# Patient Record
Sex: FEMALE | Race: WHITE | NOT HISPANIC OR LATINO | Employment: FULL TIME | URBAN - METROPOLITAN AREA
[De-identification: names, ages, dates, MRNs, and addresses within clinical notes are randomized per-mention and may not be internally consistent; named-entity substitution may affect disease eponyms.]

---

## 2019-04-08 ENCOUNTER — OFFICE VISIT (OUTPATIENT)
Dept: DERMATOLOGY | Facility: CLINIC | Age: 54
End: 2019-04-08
Payer: COMMERCIAL

## 2019-04-08 DIAGNOSIS — D22.30 MELANOCYTIC NEVI OF FACE: ICD-10-CM

## 2019-04-08 DIAGNOSIS — L40.0 PSORIASIS VULGARIS: ICD-10-CM

## 2019-04-08 DIAGNOSIS — R23.8 VENOUS LAKE: Primary | ICD-10-CM

## 2019-04-08 DIAGNOSIS — L90.5 SCAR: ICD-10-CM

## 2019-04-08 DIAGNOSIS — D22.5 MELANOCYTIC NEVI OF TRUNK: ICD-10-CM

## 2019-04-08 DIAGNOSIS — Z85.828 HISTORY OF NONMELANOMA SKIN CANCER: ICD-10-CM

## 2019-04-08 PROCEDURE — 99203 OFFICE O/P NEW LOW 30 MIN: CPT | Mod: S$GLB,,, | Performed by: DERMATOLOGY

## 2019-04-08 PROCEDURE — 99203 PR OFFICE/OUTPT VISIT, NEW, LEVL III, 30-44 MIN: ICD-10-PCS | Mod: S$GLB,,, | Performed by: DERMATOLOGY

## 2019-04-08 NOTE — PROGRESS NOTES
"  Subjective:       Patient ID:  Jeana Izquierdo is a 54 y.o. female who presents for   Chief Complaint   Patient presents with    Skin Check     TBSE    Spot     dark, right eye lid     This is a high risk patient with a history of skin cancer who is here today to check for the development of new lesions. She c/o of a spot to her right eyelid that has been present for about 1 year. Pt states that spot is dark and comes and goes.     Patient is here today for a "mole" check.   Pt has a history of  average sun exposure in the past.   Pt recalls several blistering sunburns in the past- no  Pt has history of tanning bed use- yes  Pt has  had moles removed in the past- no  Pt has history of melanoma in first degree relatives-  no        Spot  - Initial  Affected locations: right eye  Duration: 1 year  Signs and Symptoms: dark.  Severity: mild  Timing: intermittent  Aggravated by: nothing  Relieving factors/Treatments tried: nothing  Improvement on treatment: no relief      Review of Systems   Constitutional: Negative for fever.   HENT: Negative for sore throat.    Gastrointestinal: Negative for diarrhea.   Musculoskeletal: Negative for joint swelling and arthralgias.   Skin: Positive for itching, rash, daily sunscreen use, activity-related sunscreen use and wears hat.   Hematologic/Lymphatic: Does not bruise/bleed easily.        Objective:    Physical Exam   Constitutional: She appears well-developed and well-nourished. No distress.   HENT:   Mouth/Throat: Lips normal.    Eyes: Lids are normal.  No conjunctival no injection.   Neurological: She is alert and oriented to person, place, and time. She is not disoriented.   Psychiatric: She has a normal mood and affect.   Skin:   Areas Examined (abnormalities noted in diagram):   Scalp / Hair Palpated and Inspected  Head / Face Inspection Performed  Neck Inspection Performed  Chest / Axilla Inspection Performed  Abdomen Inspection Performed  Genitals / Buttocks / " Groin Inspection Performed  Back Inspection Performed  RUE Inspected  LUE Inspection Performed  RLE Inspected  LLE Inspection Performed  Nails and Digits Inspection Performed                   Diagram Legend     Erythematous scaling macule/papule c/w actinic keratosis       Vascular papule c/w angioma      Pigmented verrucoid papule/plaque c/w seborrheic keratosis      Yellow umbilicated papule c/w sebaceous hyperplasia      Irregularly shaped tan macule c/w lentigo     1-2 mm smooth white papules consistent with Milia      Movable subcutaneous cyst with punctum c/w epidermal inclusion cyst      Subcutaneous movable cyst c/w pilar cyst      Firm pink to brown papule c/w dermatofibroma      Pedunculated fleshy papule(s) c/w skin tag(s)      Evenly pigmented macule c/w junctional nevus     Mildly variegated pigmented, slightly irregular-bordered macule c/w mildly atypical nevus      Flesh colored to evenly pigmented papule c/w intradermal nevus       Pink pearly papule/plaque c/w basal cell carcinoma      Erythematous hyperkeratotic cursted plaque c/w SCC      Surgical scar with no sign of skin cancer recurrence      Open and closed comedones      Inflammatory papules and pustules      Verrucoid papule consistent consistent with wart     Erythematous eczematous patches and plaques     Dystrophic onycholytic nail with subungual debris c/w onychomycosis     Umbilicated papule    Erythematous-base heme-crusted tan verrucoid plaque consistent with inflamed seborrheic keratosis     Erythematous Silvery Scaling Plaque c/w Psoriasis     See annotation      Assessment / Plan:        [place]ous [place]  Reassurance was given to the patient. No treatment is necessary.    Melanocytic nevi of face  Melanocytic nevi of trunk  Multiple benign-appearing nevi present on exam today. Reassurance provided. Counseled patient to periodically examine moles and return to clinic if any changes in size, shape, or color are noted or if it becomes  symptomatic (bleeding, itching, pain, etc).    Scar  History of nonmelanoma skin cancer  Area(s) of previous nonmelanoma skin cancer evaluated with no signs of recurrence. Reassurance provided.    Psoriasis vulgaris  Discussed the nature of psoriasis and the available therapeutic options, including topical therapy, phototherapy, systemic medications, and biologics. Patient would like to continue with topicals for now.  Continue betamethasone diprop lotion prn.  Discussed the potential for psoriatic arthritis and the need for early treatment of any joint disease as the joint disease is irreversible.     Follow up in about 1 year (around 4/8/2020) for TBSE, or sooner if any new problems or changing lesions.

## 2019-06-12 ENCOUNTER — PATIENT MESSAGE (OUTPATIENT)
Dept: DERMATOLOGY | Facility: CLINIC | Age: 54
End: 2019-06-12

## 2021-02-25 ENCOUNTER — IMMUNIZATION (OUTPATIENT)
Dept: INTERNAL MEDICINE | Facility: CLINIC | Age: 56
End: 2021-02-25
Payer: COMMERCIAL

## 2021-02-25 DIAGNOSIS — Z23 NEED FOR VACCINATION: Primary | ICD-10-CM

## 2021-02-25 PROCEDURE — 91300 COVID-19, MRNA, LNP-S, PF, 30 MCG/0.3 ML DOSE VACCINE: CPT | Mod: PBBFAC | Performed by: INTERNAL MEDICINE

## 2021-03-18 ENCOUNTER — IMMUNIZATION (OUTPATIENT)
Dept: INTERNAL MEDICINE | Facility: CLINIC | Age: 56
End: 2021-03-18
Payer: COMMERCIAL

## 2021-03-18 DIAGNOSIS — Z23 NEED FOR VACCINATION: Primary | ICD-10-CM

## 2021-03-18 PROCEDURE — 91300 COVID-19, MRNA, LNP-S, PF, 30 MCG/0.3 ML DOSE VACCINE: CPT | Mod: PBBFAC | Performed by: INTERNAL MEDICINE

## 2021-03-18 PROCEDURE — 0002A COVID-19, MRNA, LNP-S, PF, 30 MCG/0.3 ML DOSE VACCINE: CPT | Mod: PBBFAC | Performed by: INTERNAL MEDICINE

## 2021-06-17 ENCOUNTER — PATIENT MESSAGE (OUTPATIENT)
Dept: DERMATOLOGY | Facility: CLINIC | Age: 56
End: 2021-06-17

## 2021-06-18 ENCOUNTER — OFFICE VISIT (OUTPATIENT)
Dept: DERMATOLOGY | Facility: CLINIC | Age: 56
End: 2021-06-18
Payer: COMMERCIAL

## 2021-06-18 DIAGNOSIS — L40.0 PSORIASIS VULGARIS: Primary | ICD-10-CM

## 2021-06-18 PROCEDURE — 99214 OFFICE O/P EST MOD 30 MIN: CPT | Mod: S$GLB,,, | Performed by: DERMATOLOGY

## 2021-06-18 PROCEDURE — 1126F PR PAIN SEVERITY QUANTIFIED, NO PAIN PRESENT: ICD-10-PCS | Mod: S$GLB,,, | Performed by: DERMATOLOGY

## 2021-06-18 PROCEDURE — 1126F AMNT PAIN NOTED NONE PRSNT: CPT | Mod: S$GLB,,, | Performed by: DERMATOLOGY

## 2021-06-18 PROCEDURE — 99214 PR OFFICE/OUTPT VISIT, EST, LEVL IV, 30-39 MIN: ICD-10-PCS | Mod: S$GLB,,, | Performed by: DERMATOLOGY

## 2021-06-18 RX ORDER — BETAMETHASONE VALERATE 0.1 %
LOTION (ML) TOPICAL
Qty: 60 ML | Refills: 3 | Status: SHIPPED | OUTPATIENT
Start: 2021-06-18 | End: 2022-06-06

## 2021-06-18 RX ORDER — BETAMETHASONE DIPROPIONATE 0.5 MG/G
CREAM TOPICAL
Qty: 50 G | Refills: 5 | Status: SHIPPED | OUTPATIENT
Start: 2021-06-18 | End: 2022-06-06

## 2021-06-21 ENCOUNTER — TELEPHONE (OUTPATIENT)
Dept: DERMATOLOGY | Facility: CLINIC | Age: 56
End: 2021-06-21

## 2021-07-06 ENCOUNTER — TELEPHONE (OUTPATIENT)
Dept: DERMATOLOGY | Facility: CLINIC | Age: 56
End: 2021-07-06

## 2022-06-06 ENCOUNTER — OFFICE VISIT (OUTPATIENT)
Dept: DERMATOLOGY | Facility: CLINIC | Age: 57
End: 2022-06-06
Payer: COMMERCIAL

## 2022-06-06 DIAGNOSIS — D22.9 MULTIPLE BENIGN NEVI: ICD-10-CM

## 2022-06-06 DIAGNOSIS — Z12.83 SCREENING EXAM FOR SKIN CANCER: ICD-10-CM

## 2022-06-06 DIAGNOSIS — Z85.828 HISTORY OF NONMELANOMA SKIN CANCER: ICD-10-CM

## 2022-06-06 DIAGNOSIS — L81.4 LENTIGINES: ICD-10-CM

## 2022-06-06 DIAGNOSIS — L40.0 PSORIASIS VULGARIS: Primary | ICD-10-CM

## 2022-06-06 DIAGNOSIS — L91.0 KELOID: ICD-10-CM

## 2022-06-06 DIAGNOSIS — D22.30 MELANOCYTIC NEVI OF FACE: ICD-10-CM

## 2022-06-06 DIAGNOSIS — D49.2 NEOPLASM OF SKIN OF EYELID: ICD-10-CM

## 2022-06-06 PROCEDURE — 1160F PR REVIEW ALL MEDS BY PRESCRIBER/CLIN PHARMACIST DOCUMENTED: ICD-10-PCS | Mod: CPTII,S$GLB,, | Performed by: DERMATOLOGY

## 2022-06-06 PROCEDURE — 99214 PR OFFICE/OUTPT VISIT, EST, LEVL IV, 30-39 MIN: ICD-10-PCS | Mod: S$GLB,,, | Performed by: DERMATOLOGY

## 2022-06-06 PROCEDURE — 1159F MED LIST DOCD IN RCRD: CPT | Mod: CPTII,S$GLB,, | Performed by: DERMATOLOGY

## 2022-06-06 PROCEDURE — 99214 OFFICE O/P EST MOD 30 MIN: CPT | Mod: S$GLB,,, | Performed by: DERMATOLOGY

## 2022-06-06 PROCEDURE — 1159F PR MEDICATION LIST DOCUMENTED IN MEDICAL RECORD: ICD-10-PCS | Mod: CPTII,S$GLB,, | Performed by: DERMATOLOGY

## 2022-06-06 PROCEDURE — 1160F RVW MEDS BY RX/DR IN RCRD: CPT | Mod: CPTII,S$GLB,, | Performed by: DERMATOLOGY

## 2022-06-06 RX ORDER — BETAMETHASONE DIPROPIONATE 0.5 MG/G
CREAM TOPICAL
Qty: 50 G | Refills: 5 | Status: SHIPPED | OUTPATIENT
Start: 2022-06-06 | End: 2023-05-02 | Stop reason: SDUPTHER

## 2022-06-06 RX ORDER — BETAMETHASONE DIPROPIONATE 0.5 MG/G
LOTION TOPICAL
Qty: 60 ML | Refills: 3 | Status: SHIPPED | OUTPATIENT
Start: 2022-06-06 | End: 2023-05-02 | Stop reason: SDUPTHER

## 2022-06-06 NOTE — PROGRESS NOTES
"  Patient Information  Name: Jeana Izquierdo  : 1965  MRN: [id number]     Referring Physician:  No ref. provider found   Primary Care Physician:  Princess Orta MD   Date of Visit: 2022      Subjective:     History of Present lllness:    Jeana Izquierdo is a 57 y.o. female who presents with a chief complaint of moles, lesion, and spot.  Patient is here today for a "mole" check.     Today, patient complains of lesion(s):  Location: right index  Duration: months  Symptoms: was cut, healed funny, slightly tender  Relieving factors/Previous treatments: none    Spot  Location: right eyelid  Duration: years  Signs/Symptoms: blood vessel that popped under the skin and gave her a black eye  Relieving factors/Prior treatments: none    Patient was last seen: 2021.  Prior notes by myself reviewed.   Clinical documentation obtained by nursing staff reviewed.    Review of Systems   Musculoskeletal: Negative for arthralgias.   Skin: Negative for itching and rash.   Hematologic/Lymphatic: Does not bruise/bleed easily.       Objective:   Physical Exam   Constitutional: She appears well-developed and well-nourished. No distress.   HENT:   Mouth/Throat: Lips normal.    Eyes: Lids are normal.  No conjunctival no injection.   Neurological: She is alert and oriented to person, place, and time. She is not disoriented.   Psychiatric: She has a normal mood and affect.   Skin:   Areas Examined (abnormalities noted in diagram):   Scalp / Hair Palpated and Inspected  Head / Face Inspection Performed  Neck Inspection Performed  Chest / Axilla Inspection Performed  Abdomen Inspection Performed  Genitals / Buttocks / Groin Inspection Performed  Back Inspection Performed  RUE Inspected  LUE Inspection Performed  RLE Inspected  LLE Inspection Performed  Nails and Digits Inspection Performed                     Diagram Legend     Erythematous scaling macule/papule c/w actinic keratosis       Vascular papule c/w " angioma      Pigmented verrucoid papule/plaque c/w seborrheic keratosis      Yellow umbilicated papule c/w sebaceous hyperplasia      Irregularly shaped tan macule c/w lentigo     1-2 mm smooth white papules consistent with Milia      Movable subcutaneous cyst with punctum c/w epidermal inclusion cyst      Subcutaneous movable cyst c/w pilar cyst      Firm pink to brown papule c/w dermatofibroma      Pedunculated fleshy papule(s) c/w skin tag(s)      Evenly pigmented macule c/w junctional nevus     Mildly variegated pigmented, slightly irregular-bordered macule c/w mildly atypical nevus      Flesh colored to evenly pigmented papule c/w intradermal nevus       Pink pearly papule/plaque c/w basal cell carcinoma      Erythematous hyperkeratotic cursted plaque c/w SCC      Surgical scar with no sign of skin cancer recurrence      Open and closed comedones      Inflammatory papules and pustules      Verrucoid papule consistent consistent with wart     Erythematous eczematous patches and plaques     Dystrophic onycholytic nail with subungual debris c/w onychomycosis     Umbilicated papule    Erythematous-base heme-crusted tan verrucoid plaque consistent with inflamed seborrheic keratosis     Erythematous Silvery Scaling Plaque c/w Psoriasis     See annotation    No images are attached to the encounter or orders placed in the encounter.      [] Data reviewed  [] Prior external notes reviewed  [] Independent review of test  [] Management discussed with another provider  [] Independent historian    Assessment / Plan:        Psoriasis vulgaris  - chronic problem, not at treatment goal  -     augmented betamethasone dipropionate (DIPROLENE-AF) 0.05 % cream; Apply to affected areas of body daily prn psoriasis.  Dispense: 50 g; Refill: 5  -     betamethasone dipropionate (DIPROLENE) 0.05 % lotion; Apply to scalp daily prn psoriasis  Dispense: 60 mL; Refill: 3  Side effects from the overuse of topical steroids include thinning of  skin, easy tearing/bruising of skin, stretch marks, spider veins, etc. Use the topical steroid no more than 2 days per week if used long-term and/or take breaks from the topical steroid, especially if any of the above side effects are noticed.    Keloid  Recommend OTC silicone gel sheets, such as ScarAway, or OTC silicone tape, such as CicaTape or Mepitac. Sheets can be cut to size and should be worn for 12-24 hours per day. You should remove the sheet, and wash both the scar area and the sheet everyday before reapplying. Each sheet is washable and durable for up to 14 days of use or until no longer sticky. Minimum treatment time is 2-3 months. Larger and older scars may take longer, therefore continued use is recommended if improvement is still seen after the initial 3 months.   Scar massage (apply firm pressure and rock finger side-to-side) for 5 minutes 5 times per day can also help.    Lentigines  These are benign sun spots which should be monitored for changes. Daily sun protection will reduce the number of new lesions.   Recommend using a broad-spectrum, water-resistant sunscreen with SPF of 30 or higher--reapply every 2 hours. Seek shade, wear sun-protective clothing, and perform regular skin self-exams.    Melanocytic nevi of face  Multiple benign nevi  Multiple benign-appearing nevi present on exam today. Reassurance provided. Counseled patient to periodically examine moles and return to clinic if any changes in size, shape, or color are noted or if it becomes symptomatic (bleeding, itching, pain, etc).  Recommend using a broad-spectrum, water-resistant sunscreen with SPF of 30 or higher--reapply every 2 hours. Seek shade, wear sun-protective clothing, and perform regular skin self-exams.    Neoplasm of skin of eyelid  -     Ambulatory referral/consult to Ophthalmology; Future; Expected date: 06/13/2022    History of nonmelanoma skin cancer   - stable and chronic  Area(s) of previous nonmelanoma skin cancer  evaluated with no evidence of recurrence. Reassurance provided.  Recommend using a broad-spectrum, water-resistant sunscreen with SPF of 30 or higher--reapply every 2 hours. Seek shade, wear sun-protective clothing, and perform regular skin self-exams.    Screening exam for skin cancer  Total body skin examination performed today as noted in physical exam. No lesions suspicious for malignancy were seen.  Recommend using a broad-spectrum, water-resistant sunscreen with SPF of 30 or higher--reapply every 2 hours. Seek shade, wear sun-protective clothing, and perform regular skin self-exams.    Follow up in about 1 year (around 6/6/2023) for TBSE, or sooner if symptoms worsening or not improving.      Nadia Vail MD, FAAD  [facility]

## 2022-06-06 NOTE — PATIENT INSTRUCTIONS
Recommend OTC silicone gel sheets, such as ScarAway, or OTC silicone tape, such as CicaTape or Mepitac. Sheets can be cut to size and should be worn for 12-24 hours per day. You should remove the sheet, and wash both the scar area and the sheet everyday before reapplying. Each sheet is washable and durable for up to 14 days of use or until no longer sticky. Minimum treatment time is 2-3 months. Larger and older scars may take longer, therefore continued use is recommended if improvement is still seen after the initial 3 months.   Scar massage (apply firm pressure and rock finger side-to-side) for 5 minutes 5 times per day can also help.

## 2022-10-13 ENCOUNTER — OFFICE VISIT (OUTPATIENT)
Dept: OPHTHALMOLOGY | Facility: CLINIC | Age: 57
End: 2022-10-13
Payer: COMMERCIAL

## 2022-10-13 DIAGNOSIS — D18.01 HEMANGIOMA OF EYELID: Primary | ICD-10-CM

## 2022-10-13 PROCEDURE — 92002 INTRM OPH EXAM NEW PATIENT: CPT | Mod: S$GLB,,, | Performed by: OPHTHALMOLOGY

## 2022-10-13 PROCEDURE — 1160F PR REVIEW ALL MEDS BY PRESCRIBER/CLIN PHARMACIST DOCUMENTED: ICD-10-PCS | Mod: CPTII,S$GLB,, | Performed by: OPHTHALMOLOGY

## 2022-10-13 PROCEDURE — 1159F PR MEDICATION LIST DOCUMENTED IN MEDICAL RECORD: ICD-10-PCS | Mod: CPTII,S$GLB,, | Performed by: OPHTHALMOLOGY

## 2022-10-13 PROCEDURE — 1160F RVW MEDS BY RX/DR IN RCRD: CPT | Mod: CPTII,S$GLB,, | Performed by: OPHTHALMOLOGY

## 2022-10-13 PROCEDURE — 92285 EXTERNAL PHOTOGRAPHY - OU - BOTH EYES: ICD-10-PCS | Mod: S$GLB,,, | Performed by: OPHTHALMOLOGY

## 2022-10-13 PROCEDURE — 1159F MED LIST DOCD IN RCRD: CPT | Mod: CPTII,S$GLB,, | Performed by: OPHTHALMOLOGY

## 2022-10-13 PROCEDURE — 99999 PR PBB SHADOW E&M-EST. PATIENT-LVL III: ICD-10-PCS | Mod: PBBFAC,,, | Performed by: OPHTHALMOLOGY

## 2022-10-13 PROCEDURE — 99999 PR PBB SHADOW E&M-EST. PATIENT-LVL III: CPT | Mod: PBBFAC,,, | Performed by: OPHTHALMOLOGY

## 2022-10-13 PROCEDURE — 92285 EXTERNAL OCULAR PHOTOGRAPHY: CPT | Mod: S$GLB,,, | Performed by: OPHTHALMOLOGY

## 2022-10-13 PROCEDURE — 92002 PR EYE EXAM, NEW PATIENT,INTERMED: ICD-10-PCS | Mod: S$GLB,,, | Performed by: OPHTHALMOLOGY

## 2022-10-13 NOTE — PROGRESS NOTES
HPI    Jeana Izquierdo is a/an 57 y.o. female here for neoplasm RUL   evaluation.   Referred by: Dr. Vail  Eye Meds: none  Oral Meds: none    Patient states she had the same lesion a little bigger in same area last   summer that bursted and bruised the eye. A couple weeks later it   reappeared. Would like to have lesion removed.     Last edited by Isela Briggs on 10/13/2022  3:48 PM.            Assessment /Plan     For exam results, see Encounter Report.    Hemangioma of eyelid  -     Ambulatory referral/consult to Ophthalmology  -     External Photography - OU - Both Eyes    The patient is a pleasant 57-year-old female here for evaluation of right upper eyelid pigmented lesion.  The patient 1st noticed this approximately 3 to 4 years and it popped 15 months ago when she was at the beach and then she had subsequent right upper eyelid ecchymosis which resolved on its own.  Since that time, the patient has noticed a well-circumscribed area with pigmentation at the central aspect of the right upper eyelid.  The patient has a history of basal cell carcinoma status post Mohs surgery on the bridge of her nose.  At this time, the area is asymptomatic.      On exam, the patient has a well-circumscribed subepidermal vascular lesion within the central aspect of the right upper eyelid.    These findings were discussed with the patient.      Discussed excision in the minor procedure room versus observation.  Due to the low chance of malignancy, the plan is for observation at this time.      Return for repeat measurements in 6 months sooner any worsening

## 2022-10-23 ENCOUNTER — PATIENT MESSAGE (OUTPATIENT)
Dept: DERMATOLOGY | Facility: CLINIC | Age: 57
End: 2022-10-23
Payer: COMMERCIAL

## 2022-10-23 DIAGNOSIS — L40.0 PSORIASIS VULGARIS: Primary | ICD-10-CM

## 2022-10-24 ENCOUNTER — TELEPHONE (OUTPATIENT)
Dept: DERMATOLOGY | Facility: CLINIC | Age: 57
End: 2022-10-24
Payer: COMMERCIAL

## 2022-10-24 ENCOUNTER — PATIENT MESSAGE (OUTPATIENT)
Dept: DERMATOLOGY | Facility: CLINIC | Age: 57
End: 2022-10-24
Payer: COMMERCIAL

## 2022-10-24 NOTE — TELEPHONE ENCOUNTER
Left message to return mu call to get light scheduled. Requested she reach out to her ins as it looks like 75$ co-payments/

## 2022-11-21 ENCOUNTER — TELEPHONE (OUTPATIENT)
Dept: DERMATOLOGY | Facility: CLINIC | Age: 57
End: 2022-11-21
Payer: COMMERCIAL

## 2022-11-21 NOTE — TELEPHONE ENCOUNTER
Called pt back, she states that she is trying to get in touch with MsKatheryn Diana GARCIAKatheryn To schedule light tx starting in January. Chart was forwarded.

## 2022-12-13 ENCOUNTER — PATIENT MESSAGE (OUTPATIENT)
Dept: DERMATOLOGY | Facility: CLINIC | Age: 57
End: 2022-12-13
Payer: COMMERCIAL

## 2022-12-28 ENCOUNTER — TELEPHONE (OUTPATIENT)
Dept: DERMATOLOGY | Facility: CLINIC | Age: 57
End: 2022-12-28
Payer: COMMERCIAL

## 2022-12-28 NOTE — TELEPHONE ENCOUNTER
----- Message from Liana Mart sent at 12/28/2022  8:23 AM CST -----  Contact: pt  Pt states that she was suppose to start light treatment therapy, but the times offered do not line up with her schedule. She would like a call back to see if there are any other options for treatment for her.     Confirmed contact below:  Contact Name:Jeana Izquierdo  Phone Number: [phone]

## 2023-04-04 ENCOUNTER — OFFICE VISIT (OUTPATIENT)
Dept: OPHTHALMOLOGY | Facility: CLINIC | Age: 58
End: 2023-04-04
Payer: COMMERCIAL

## 2023-04-04 DIAGNOSIS — D18.01 HEMANGIOMA OF EYELID: Primary | ICD-10-CM

## 2023-04-04 DIAGNOSIS — H57.813 BROW PTOSIS, BILATERAL: ICD-10-CM

## 2023-04-04 PROCEDURE — 92285 EXTERNAL OCULAR PHOTOGRAPHY: CPT | Mod: S$GLB,,, | Performed by: OPHTHALMOLOGY

## 2023-04-04 PROCEDURE — 99999 PR PBB SHADOW E&M-EST. PATIENT-LVL II: ICD-10-PCS | Mod: PBBFAC,,, | Performed by: OPHTHALMOLOGY

## 2023-04-04 PROCEDURE — 92285 EXTERNAL PHOTOGRAPHY - OU - BOTH EYES: ICD-10-PCS | Mod: S$GLB,,, | Performed by: OPHTHALMOLOGY

## 2023-04-04 PROCEDURE — 99213 PR OFFICE/OUTPT VISIT, EST, LEVL III, 20-29 MIN: ICD-10-PCS | Mod: S$GLB,,, | Performed by: OPHTHALMOLOGY

## 2023-04-04 PROCEDURE — 99999 PR PBB SHADOW E&M-EST. PATIENT-LVL II: CPT | Mod: PBBFAC,,, | Performed by: OPHTHALMOLOGY

## 2023-04-04 PROCEDURE — 1160F PR REVIEW ALL MEDS BY PRESCRIBER/CLIN PHARMACIST DOCUMENTED: ICD-10-PCS | Mod: CPTII,S$GLB,, | Performed by: OPHTHALMOLOGY

## 2023-04-04 PROCEDURE — 1159F MED LIST DOCD IN RCRD: CPT | Mod: CPTII,S$GLB,, | Performed by: OPHTHALMOLOGY

## 2023-04-04 PROCEDURE — 1159F PR MEDICATION LIST DOCUMENTED IN MEDICAL RECORD: ICD-10-PCS | Mod: CPTII,S$GLB,, | Performed by: OPHTHALMOLOGY

## 2023-04-04 PROCEDURE — 1160F RVW MEDS BY RX/DR IN RCRD: CPT | Mod: CPTII,S$GLB,, | Performed by: OPHTHALMOLOGY

## 2023-04-04 PROCEDURE — 99213 OFFICE O/P EST LOW 20 MIN: CPT | Mod: S$GLB,,, | Performed by: OPHTHALMOLOGY

## 2023-04-04 NOTE — PROGRESS NOTES
HPI    Patient here for hemangioma f/u   No changes to RUL or pain/discomfort  Mentions bilateral temporal upper eyelid heaviness but not interfering   with VA.   Last edited by Isela Briggs on 4/4/2023  4:29 PM.            Assessment /Plan     For exam results, see Encounter Report.    Hemangioma of eyelid  -     External Photography - OU - Both Eyes    Brow ptosis, bilateral      Stable size of hemangioma. No new complaints.     Son's wedding in November.     Return in 3 months for repeat evaluation sooner any worsening.     Patient to consider chemical brow lift to sub brow temporal orbicularis in future.

## 2023-05-02 ENCOUNTER — OFFICE VISIT (OUTPATIENT)
Dept: DERMATOLOGY | Facility: CLINIC | Age: 58
End: 2023-05-02
Payer: COMMERCIAL

## 2023-05-02 DIAGNOSIS — L81.4 LENTIGINES: ICD-10-CM

## 2023-05-02 DIAGNOSIS — Z12.83 SCREENING EXAM FOR SKIN CANCER: ICD-10-CM

## 2023-05-02 DIAGNOSIS — L40.0 PSORIASIS VULGARIS: Primary | ICD-10-CM

## 2023-05-02 DIAGNOSIS — I83.93 ASYMPTOMATIC VARICOSE VEINS OF BOTH LOWER EXTREMITIES: ICD-10-CM

## 2023-05-02 DIAGNOSIS — Z85.828 HISTORY OF NONMELANOMA SKIN CANCER: ICD-10-CM

## 2023-05-02 DIAGNOSIS — D22.9 MULTIPLE BENIGN NEVI: ICD-10-CM

## 2023-05-02 PROCEDURE — 99214 OFFICE O/P EST MOD 30 MIN: CPT | Mod: S$GLB,,, | Performed by: DERMATOLOGY

## 2023-05-02 PROCEDURE — 1160F PR REVIEW ALL MEDS BY PRESCRIBER/CLIN PHARMACIST DOCUMENTED: ICD-10-PCS | Mod: CPTII,S$GLB,, | Performed by: DERMATOLOGY

## 2023-05-02 PROCEDURE — 1160F RVW MEDS BY RX/DR IN RCRD: CPT | Mod: CPTII,S$GLB,, | Performed by: DERMATOLOGY

## 2023-05-02 PROCEDURE — 1159F MED LIST DOCD IN RCRD: CPT | Mod: CPTII,S$GLB,, | Performed by: DERMATOLOGY

## 2023-05-02 PROCEDURE — 1159F PR MEDICATION LIST DOCUMENTED IN MEDICAL RECORD: ICD-10-PCS | Mod: CPTII,S$GLB,, | Performed by: DERMATOLOGY

## 2023-05-02 PROCEDURE — 99214 PR OFFICE/OUTPT VISIT, EST, LEVL IV, 30-39 MIN: ICD-10-PCS | Mod: S$GLB,,, | Performed by: DERMATOLOGY

## 2023-05-02 RX ORDER — BETAMETHASONE DIPROPIONATE 0.5 MG/G
CREAM TOPICAL
Qty: 50 G | Refills: 5 | Status: SHIPPED | OUTPATIENT
Start: 2023-05-02

## 2023-05-02 RX ORDER — BETAMETHASONE DIPROPIONATE 0.5 MG/G
LOTION TOPICAL
Qty: 60 ML | Refills: 3 | Status: SHIPPED | OUTPATIENT
Start: 2023-05-02

## 2023-05-02 NOTE — PROGRESS NOTES
"  Patient Information  Name: Jeana Izquierdo  : 1965  MRN: [id number]     Referring Physician:  No ref. provider found   Primary Care Physician:  Chasity Kimball MD   Date of Visit: 2023      Subjective:     History of Present lllness:    Jeana Izquierdo is a 58 y.o. female who presents with a chief complaint of moles.  This is a high risk patient with a personal history of nonmelanoma skin cancer who is here today to check for the development of new lesions.  Patient is here today for a "mole" check.   Today, patient has no additional complaints. Denies any new, changing, or symptomatic lesions on the skin.    Patient was last seen: 2022.  Prior notes by myself reviewed.   Clinical documentation obtained by nursing staff reviewed.    Review of Systems    Objective:   Physical Exam   Constitutional: She appears well-developed and well-nourished. No distress.   HENT:   Mouth/Throat: Lips normal.    Eyes: Lids are normal.  No conjunctival no injection.   Neurological: She is alert and oriented to person, place, and time. She is not disoriented.   Psychiatric: She has a normal mood and affect.   Skin:   Areas Examined (abnormalities noted in diagram):   Scalp / Hair Palpated and Inspected  Head / Face Inspection Performed  Neck Inspection Performed  Chest / Axilla Inspection Performed  Abdomen Inspection Performed  Genitals / Buttocks / Groin Inspection Performed  Back Inspection Performed  RUE Inspected  LUE Inspection Performed  RLE Inspected  LLE Inspection Performed  Nails and Digits Inspection Performed               Diagram Legend     Erythematous scaling macule/papule c/w actinic keratosis       Vascular papule c/w angioma      Pigmented verrucoid papule/plaque c/w seborrheic keratosis      Yellow umbilicated papule c/w sebaceous hyperplasia      Irregularly shaped tan macule c/w lentigo     1-2 mm smooth white papules consistent with Milia      Movable subcutaneous cyst with " punctum c/w epidermal inclusion cyst      Subcutaneous movable cyst c/w pilar cyst      Firm pink to brown papule c/w dermatofibroma      Pedunculated fleshy papule(s) c/w skin tag(s)      Evenly pigmented macule c/w junctional nevus     Mildly variegated pigmented, slightly irregular-bordered macule c/w mildly atypical nevus      Flesh colored to evenly pigmented papule c/w intradermal nevus       Pink pearly papule/plaque c/w basal cell carcinoma      Erythematous hyperkeratotic cursted plaque c/w SCC      Surgical scar with no sign of skin cancer recurrence      Open and closed comedones      Inflammatory papules and pustules      Verrucoid papule consistent consistent with wart     Erythematous eczematous patches and plaques     Dystrophic onycholytic nail with subungual debris c/w onychomycosis     Umbilicated papule    Erythematous-base heme-crusted tan verrucoid plaque consistent with inflamed seborrheic keratosis     Erythematous Silvery Scaling Plaque c/w Psoriasis     See annotation    No images are attached to the encounter or orders placed in the encounter.      [] Data reviewed  [] Prior external notes reviewed  [] Independent review of test  [] Management discussed with another provider  [] Independent historian    Assessment / Plan:        Psoriasis vulgaris  - chronic problem with exacerbation/progression  Pt would like to start a trial of nbUVB during the summer when her job allows.  If she shows improvement, then will seek approval for home unit from insurance.  -     augmented betamethasone dipropionate (DIPROLENE-AF) 0.05 % cream; Apply to affected areas of body daily prn psoriasis.  Dispense: 50 g; Refill: 5  -     betamethasone dipropionate (DIPROLENE) 0.05 % lotion; Apply to scalp daily prn psoriasis  Dispense: 60 mL; Refill: 3    Multiple benign nevi  Multiple benign-appearing nevi present on exam today. Reassurance provided. Counseled patient to periodically examine moles and return to  clinic if any changes in size, shape, or color are noted or if it becomes symptomatic (bleeding, itching, pain, etc).  Recommend using a broad-spectrum, water-resistant sunscreen with SPF of 30 or higher--reapply every 2 hours. Seek shade, wear sun-protective clothing, and perform regular skin self-exams.    Lentigines  These are benign sun spots which should be monitored for changes. Daily sun protection will reduce the number of new lesions.   Recommend using a broad-spectrum, water-resistant sunscreen with SPF of 30 or higher--reapply every 2 hours. Seek shade, wear sun-protective clothing, and perform regular skin self-exams.    Asymptomatic varicose veins of both lower extremities  -     Ambulatory referral/consult to Vascular Surgery; Future; Expected date: 05/09/2023    History of nonmelanoma skin cancer   - stable and chronic  Area(s) of previous nonmelanoma skin cancer evaluated with no evidence of recurrence. Reassurance provided.  Recommend using a broad-spectrum, water-resistant sunscreen with SPF of 30 or higher--reapply every 2 hours. Seek shade, wear sun-protective clothing, and perform regular skin self-exams.    Screening exam for skin cancer  Total body skin examination performed today as noted in physical exam. No lesions suspicious for malignancy were seen.  Recommend using a broad-spectrum, water-resistant sunscreen with SPF of 30 or higher--reapply every 2 hours. Seek shade, wear sun-protective clothing, and perform regular skin self-exams.      Follow up in about 3 months (around 8/2/2023) for follow up, or sooner if symptoms worsening or not improving.      Nadia Vail MD, FAAD  [facility]         Yes

## 2023-05-03 ENCOUNTER — PATIENT MESSAGE (OUTPATIENT)
Dept: ADMINISTRATIVE | Facility: OTHER | Age: 58
End: 2023-05-03
Payer: COMMERCIAL

## 2023-05-03 DIAGNOSIS — I83.893 VARICOSE VEINS OF LEG WITH EDEMA, BILATERAL: Primary | ICD-10-CM

## 2023-05-04 ENCOUNTER — TELEPHONE (OUTPATIENT)
Dept: DERMATOLOGY | Facility: CLINIC | Age: 58
End: 2023-05-04
Payer: COMMERCIAL

## 2023-05-04 NOTE — TELEPHONE ENCOUNTER
Left message on voice mail to contact me when ready to get started with light. Also that her co-pay is 75$ and many want to discuss options with Dr Vail.

## 2023-05-23 ENCOUNTER — TELEPHONE (OUTPATIENT)
Dept: DERMATOLOGY | Facility: CLINIC | Age: 58
End: 2023-05-23
Payer: COMMERCIAL

## 2023-05-23 NOTE — TELEPHONE ENCOUNTER
----- Message from Mayra Goddard LPN sent at 5/23/2023  2:38 PM CDT -----  How would you like me to proceed?  ----- Message -----  From: Bess Mancilla  Sent: 5/23/2023  10:49 AM CDT  To: Diana Kiser, #    Jeana Monterrosotle calling regarding Appointment Access for wanting to be scheduled to start the light treatment. PT stated that she have been sending messages to contact Diana Kiser  but still no reply since May 5th, Please call back to PT to inform [phone]

## 2023-05-23 NOTE — TELEPHONE ENCOUNTER
Spoke with pt and states she has been set up for [facility].----- Message from Nadia Vail MD sent at 5/23/2023  4:37 PM CDT -----  Can you try to get in touch with Diana [phone]?  ----- Message -----  From: Mayra Goddard LPN  Sent: 5/23/2023   2:39 PM CDT  To: Nadia Vail MD    How would you like me to proceed?  ----- Message -----  From: Bess Mancilla  Sent: 5/23/2023  10:49 AM CDT  To: Diana Kiser, #    Jeana Izquierdo calling regarding Appointment Access for wanting to be scheduled to start the light treatment. PT stated that she have been sending messages to contact Diana Kiser  but still no reply since May 5th, Please call back to PT to inform [phone]

## 2023-05-31 ENCOUNTER — CLINICAL SUPPORT (OUTPATIENT)
Dept: DERMATOLOGY | Facility: CLINIC | Age: 58
End: 2023-05-31
Payer: COMMERCIAL

## 2023-05-31 DIAGNOSIS — L40.0 PSORIASIS VULGARIS: ICD-10-CM

## 2023-05-31 PROCEDURE — 99999 PR PBB SHADOW E&M-EST. PATIENT-LVL I: CPT | Mod: PBBFAC,,,

## 2023-05-31 PROCEDURE — 96900 PR ULTRAVIOLET LIGHT THERAPY: ICD-10-PCS | Mod: S$GLB,,, | Performed by: DERMATOLOGY

## 2023-05-31 PROCEDURE — 96900 ACTINOTHERAPY UV LIGHT: CPT | Mod: S$GLB,,, | Performed by: DERMATOLOGY

## 2023-05-31 PROCEDURE — 99999 PR PBB SHADOW E&M-EST. PATIENT-LVL I: ICD-10-PCS | Mod: PBBFAC,,,

## 2023-05-31 RX ORDER — CALCIUM CARBONATE/VITAMIN D3 500 MG-10
TABLET,CHEWABLE ORAL ONCE
COMMUNITY

## 2023-05-31 NOTE — PROGRESS NOTES
Light tx 1 for psoriasis. NB-UVB to body at 200 mj with eyes shielded. Goggles issued and unit safety discussed.

## 2023-06-01 ENCOUNTER — TELEPHONE (OUTPATIENT)
Dept: DERMATOLOGY | Facility: CLINIC | Age: 58
End: 2023-06-01
Payer: COMMERCIAL

## 2023-06-01 DIAGNOSIS — L40.0 PSORIASIS VULGARIS: Primary | ICD-10-CM

## 2023-06-01 RX ORDER — FLUOCINOLONE ACETONIDE 0.11 MG/ML
OIL TOPICAL
Qty: 1 EACH | Refills: 5 | Status: SHIPPED | OUTPATIENT
Start: 2023-06-01 | End: 2023-06-12 | Stop reason: SDUPTHER

## 2023-06-01 NOTE — TELEPHONE ENCOUNTER
Psoriasis vulgaris  -     DERMA-SMOOTHE/FS SCALP OIL 0.01 % Oil; Apply oil to damp scalp qhs prn scaling. Can cover with shower cap after application.  Dispense: 1 each; Refill: 5

## 2023-06-01 NOTE — TELEPHONE ENCOUNTER
----- Message from Diana Kiser sent at 5/31/2023 10:32 AM CDT -----  Good morning,   I started light on her this am. When talking with her she would like to try a scalp treatment . I let her know I would reach out to you about [place] Smoothe and a shower cap for a week or two. Patient is interested and asked if you would be will ing to send her in a RX .   Thanks so much.

## 2023-06-02 ENCOUNTER — TELEPHONE (OUTPATIENT)
Dept: DERMATOLOGY | Facility: CLINIC | Age: 58
End: 2023-06-02
Payer: COMMERCIAL

## 2023-06-02 DIAGNOSIS — L40.0 PSORIASIS VULGARIS: Primary | ICD-10-CM

## 2023-06-02 RX ORDER — MOMETASONE FUROATE 1 MG/ML
SOLUTION TOPICAL
Qty: 60 ML | Refills: 3 | Status: SHIPPED | OUTPATIENT
Start: 2023-06-02 | End: 2023-06-13 | Stop reason: SDUPTHER

## 2023-06-12 ENCOUNTER — CLINICAL SUPPORT (OUTPATIENT)
Dept: DERMATOLOGY | Facility: CLINIC | Age: 58
End: 2023-06-12
Payer: COMMERCIAL

## 2023-06-12 ENCOUNTER — PATIENT MESSAGE (OUTPATIENT)
Dept: DERMATOLOGY | Facility: CLINIC | Age: 58
End: 2023-06-12
Payer: COMMERCIAL

## 2023-06-12 DIAGNOSIS — L40.0 PSORIASIS VULGARIS: ICD-10-CM

## 2023-06-12 PROCEDURE — 99999 PR PBB SHADOW E&M-EST. PATIENT-LVL II: ICD-10-PCS | Mod: PBBFAC,,,

## 2023-06-12 PROCEDURE — 96900 ACTINOTHERAPY UV LIGHT: CPT | Mod: S$GLB,,, | Performed by: DERMATOLOGY

## 2023-06-12 PROCEDURE — 96900 PR ULTRAVIOLET LIGHT THERAPY: ICD-10-PCS | Mod: S$GLB,,, | Performed by: DERMATOLOGY

## 2023-06-12 PROCEDURE — 99999 PR PBB SHADOW E&M-EST. PATIENT-LVL II: CPT | Mod: PBBFAC,,,

## 2023-06-12 RX ORDER — FLUOCINOLONE ACETONIDE 0.11 MG/ML
OIL TOPICAL
Qty: 118.28 ML | Refills: 5 | Status: SHIPPED | OUTPATIENT
Start: 2023-06-12

## 2023-06-12 NOTE — TELEPHONE ENCOUNTER
I have requested that Dr MCCALLUM send it to the [facility], they should call you with details

## 2023-06-13 DIAGNOSIS — L40.0 PSORIASIS VULGARIS: ICD-10-CM

## 2023-06-13 RX ORDER — MOMETASONE FUROATE 1 MG/ML
SOLUTION TOPICAL
Qty: 60 ML | Refills: 3 | Status: SHIPPED | OUTPATIENT
Start: 2023-06-13

## 2023-06-14 ENCOUNTER — TELEPHONE (OUTPATIENT)
Dept: PHARMACY | Facility: CLINIC | Age: 58
End: 2023-06-14
Payer: COMMERCIAL

## 2023-06-15 ENCOUNTER — CLINICAL SUPPORT (OUTPATIENT)
Dept: DERMATOLOGY | Facility: CLINIC | Age: 58
End: 2023-06-15
Payer: COMMERCIAL

## 2023-06-15 DIAGNOSIS — L40.0 PSORIASIS VULGARIS: ICD-10-CM

## 2023-06-15 PROCEDURE — 96900 PR ULTRAVIOLET LIGHT THERAPY: ICD-10-PCS | Mod: S$GLB,,, | Performed by: DERMATOLOGY

## 2023-06-15 PROCEDURE — 96900 ACTINOTHERAPY UV LIGHT: CPT | Mod: S$GLB,,, | Performed by: DERMATOLOGY

## 2023-06-15 PROCEDURE — 99999 PR PBB SHADOW E&M-EST. PATIENT-LVL II: ICD-10-PCS | Mod: PBBFAC,,,

## 2023-06-15 PROCEDURE — 99999 PR PBB SHADOW E&M-EST. PATIENT-LVL II: CPT | Mod: PBBFAC,,,

## 2023-06-19 ENCOUNTER — CLINICAL SUPPORT (OUTPATIENT)
Dept: DERMATOLOGY | Facility: CLINIC | Age: 58
End: 2023-06-19
Payer: COMMERCIAL

## 2023-06-19 DIAGNOSIS — L40.0 PSORIASIS VULGARIS: ICD-10-CM

## 2023-06-19 PROCEDURE — 96900 PR ULTRAVIOLET LIGHT THERAPY: ICD-10-PCS | Mod: S$GLB,,, | Performed by: DERMATOLOGY

## 2023-06-19 PROCEDURE — 96900 ACTINOTHERAPY UV LIGHT: CPT | Mod: S$GLB,,, | Performed by: DERMATOLOGY

## 2023-06-19 PROCEDURE — 99999 PR PBB SHADOW E&M-EST. PATIENT-LVL II: CPT | Mod: PBBFAC,,,

## 2023-06-19 PROCEDURE — 99999 PR PBB SHADOW E&M-EST. PATIENT-LVL II: ICD-10-PCS | Mod: PBBFAC,,,

## 2023-06-21 ENCOUNTER — CLINICAL SUPPORT (OUTPATIENT)
Dept: DERMATOLOGY | Facility: CLINIC | Age: 58
End: 2023-06-21
Payer: COMMERCIAL

## 2023-06-21 DIAGNOSIS — L40.0 PSORIASIS VULGARIS: ICD-10-CM

## 2023-06-21 PROCEDURE — 99999 PR PBB SHADOW E&M-EST. PATIENT-LVL II: CPT | Mod: PBBFAC,,,

## 2023-06-21 PROCEDURE — 96900 ACTINOTHERAPY UV LIGHT: CPT | Mod: S$GLB,,, | Performed by: DERMATOLOGY

## 2023-06-21 PROCEDURE — 99999 PR PBB SHADOW E&M-EST. PATIENT-LVL II: ICD-10-PCS | Mod: PBBFAC,,,

## 2023-06-21 PROCEDURE — 96900 PR ULTRAVIOLET LIGHT THERAPY: ICD-10-PCS | Mod: S$GLB,,, | Performed by: DERMATOLOGY

## 2023-06-26 ENCOUNTER — CLINICAL SUPPORT (OUTPATIENT)
Dept: DERMATOLOGY | Facility: CLINIC | Age: 58
End: 2023-06-26
Payer: COMMERCIAL

## 2023-06-26 DIAGNOSIS — L40.0 PSORIASIS VULGARIS: ICD-10-CM

## 2023-06-26 PROCEDURE — 99999 PR PBB SHADOW E&M-EST. PATIENT-LVL II: CPT | Mod: PBBFAC,,,

## 2023-06-26 PROCEDURE — 96900 ACTINOTHERAPY UV LIGHT: CPT | Mod: S$GLB,,, | Performed by: DERMATOLOGY

## 2023-06-26 PROCEDURE — 96900 PR ULTRAVIOLET LIGHT THERAPY: ICD-10-PCS | Mod: S$GLB,,, | Performed by: DERMATOLOGY

## 2023-06-26 PROCEDURE — 99999 PR PBB SHADOW E&M-EST. PATIENT-LVL II: ICD-10-PCS | Mod: PBBFAC,,,

## 2023-06-29 ENCOUNTER — OFFICE VISIT (OUTPATIENT)
Dept: DERMATOLOGY | Facility: CLINIC | Age: 58
End: 2023-06-29
Payer: COMMERCIAL

## 2023-06-29 ENCOUNTER — TELEPHONE (OUTPATIENT)
Dept: DERMATOLOGY | Facility: CLINIC | Age: 58
End: 2023-06-29

## 2023-06-29 ENCOUNTER — CLINICAL SUPPORT (OUTPATIENT)
Dept: DERMATOLOGY | Facility: CLINIC | Age: 58
End: 2023-06-29
Payer: COMMERCIAL

## 2023-06-29 DIAGNOSIS — L82.0 INFLAMED SEBORRHEIC KERATOSIS: ICD-10-CM

## 2023-06-29 DIAGNOSIS — L40.0 PSORIASIS VULGARIS: ICD-10-CM

## 2023-06-29 DIAGNOSIS — L40.0 PSORIASIS VULGARIS: Primary | ICD-10-CM

## 2023-06-29 PROCEDURE — 99214 PR OFFICE/OUTPT VISIT, EST, LEVL IV, 30-39 MIN: ICD-10-PCS | Mod: 25,S$GLB,, | Performed by: DERMATOLOGY

## 2023-06-29 PROCEDURE — 99999 PR PBB SHADOW E&M-EST. PATIENT-LVL II: ICD-10-PCS | Mod: PBBFAC,,,

## 2023-06-29 PROCEDURE — 1160F RVW MEDS BY RX/DR IN RCRD: CPT | Mod: CPTII,S$GLB,, | Performed by: DERMATOLOGY

## 2023-06-29 PROCEDURE — 1159F MED LIST DOCD IN RCRD: CPT | Mod: CPTII,S$GLB,, | Performed by: DERMATOLOGY

## 2023-06-29 PROCEDURE — 1159F PR MEDICATION LIST DOCUMENTED IN MEDICAL RECORD: ICD-10-PCS | Mod: CPTII,S$GLB,, | Performed by: DERMATOLOGY

## 2023-06-29 PROCEDURE — 1160F PR REVIEW ALL MEDS BY PRESCRIBER/CLIN PHARMACIST DOCUMENTED: ICD-10-PCS | Mod: CPTII,S$GLB,, | Performed by: DERMATOLOGY

## 2023-06-29 PROCEDURE — 99214 OFFICE O/P EST MOD 30 MIN: CPT | Mod: 25,S$GLB,, | Performed by: DERMATOLOGY

## 2023-06-29 PROCEDURE — 17110 DESTRUCTION B9 LES UP TO 14: CPT | Mod: S$GLB,,, | Performed by: DERMATOLOGY

## 2023-06-29 PROCEDURE — 17110 PR DESTRUCTION BENIGN LESIONS UP TO 14: ICD-10-PCS | Mod: S$GLB,,, | Performed by: DERMATOLOGY

## 2023-06-29 PROCEDURE — 96900 PR ULTRAVIOLET LIGHT THERAPY: ICD-10-PCS | Mod: S$GLB,,, | Performed by: DERMATOLOGY

## 2023-06-29 PROCEDURE — 99999 PR PBB SHADOW E&M-EST. PATIENT-LVL II: CPT | Mod: PBBFAC,,,

## 2023-06-29 PROCEDURE — 96900 ACTINOTHERAPY UV LIGHT: CPT | Mod: S$GLB,,, | Performed by: DERMATOLOGY

## 2023-06-29 NOTE — PATIENT INSTRUCTIONS
CRYOSURGERY      Your doctor has used a method called cryosurgery to treat your skin condition. Cryosurgery refers to the use of very cold substances to treat a variety of skin conditions such as warts, precancerous skin lesions, molluscum contagiosum, sun spots, and several benign growths. The substance we use in cryosurgery is liquid nitrogen and is so cold (-195 degrees Celsius) that it burns when administered.     Following treatment in the office, the skin may immediately itch or burn and become red. You may find the area around the lesion is affected as well. It is sometimes necessary to treat not only the lesion, but also a small area of the surrounding normal skin to achieve a good response.     A blister, and even a blood-filled blister, may form after treatment.   This is a normal response. If the blister is painful, it is acceptable to sterilize a needle with rubbing alcohol and gently pop the blister. It is important that you gently wash the area with soap and warm water as the blister fluid may contain wart virus if a wart was treated. Do not remove the roof of the blister.     The area treated can take anywhere from 1-3 weeks to heal. Healing time depends on the kind of skin lesion treated, the location, and how aggressively the lesion was treated. It is recommended that the areas treated are covered with Vaseline and a bandaid to improve healing. If a bandaid is not practical, Vaseline applied several times per day will do. Keeping these areas moist will speed the healing time.    Treatment with liquid nitrogen can leave a scar. In dark skin, it may be a light or dark scar; in light skin it may be a white or pink scar. These will generally fade with time, but may never go away completely.     If you have any concerns after your treatment, please message us via [facility] or call us at [phone].    Complex Repair And O-L Flap Text: The defect edges were debeveled with a #15 scalpel blade.  The primary defect was closed partially with a complex linear closure.  Given the location of the remaining defect, shape of the defect and the proximity to free margins an O-L flap was deemed most appropriate for complete closure of the defect.  Using a sterile surgical marker, an appropriate flap was drawn incorporating the defect and placing the expected incisions within the relaxed skin tension lines where possible.    The area thus outlined was incised deep to adipose tissue with a #15 scalpel blade.  The skin margins were undermined to an appropriate distance in all directions utilizing iris scissors.

## 2023-06-29 NOTE — PROGRESS NOTES
Patient Information  Name: Jeana Izquierdo  : 1965  MRN: [id number]     Referring Physician:  No ref. provider found   Primary Care Physician:  Chasity Kimball MD   Date of Visit: 2023      Subjective:     History of Present lllness:    Jeana Izquierdo is a 58 y.o. female who presents with a chief complaint of lesion and psoriasis.  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  Prior notes by myself reviewed.   Clinical documentation obtained by nursing staff reviewed.    Review of Systems    Objective:   Physical Exam   Constitutional: She appears well-developed and well-nourished. No distress.   Neurological: She is alert and oriented to person, place, and time. She is not disoriented.   Psychiatric: She has a normal mood and affect.   Skin:   Areas Examined (abnormalities noted in diagram):   Head / Face Inspection Performed  Neck Inspection Performed  Chest / Axilla Inspection Performed  Abdomen Inspection Performed  Back Inspection Performed  RUE Inspected  LUE Inspection Performed  RLE Inspected  LLE Inspection Performed               Diagram Legend     Erythematous scaling macule/papule c/w actinic keratosis       Vascular papule c/w angioma      Pigmented verrucoid papule/plaque c/w seborrheic keratosis      Yellow umbilicated papule c/w sebaceous hyperplasia      Irregularly shaped tan macule c/w lentigo     1-2 mm smooth white papules consistent with Milia      Movable subcutaneous cyst with punctum c/w epidermal inclusion cyst       Subcutaneous movable cyst c/w pilar cyst      Firm pink to brown papule c/w dermatofibroma      Pedunculated fleshy papule(s) c/w skin tag(s)      Evenly pigmented macule c/w junctional nevus     Mildly variegated pigmented, slightly irregular-bordered macule c/w mildly atypical nevus      Flesh colored to evenly pigmented papule c/w intradermal nevus       Pink pearly papule/plaque c/w basal cell carcinoma      Erythematous hyperkeratotic cursted plaque c/w SCC      Surgical scar with no sign of skin cancer recurrence      Open and closed comedones      Inflammatory papules and pustules      Verrucoid papule consistent consistent with wart     Erythematous eczematous patches and plaques     Dystrophic onycholytic nail with subungual debris c/w onychomycosis     Umbilicated papule    Erythematous-base heme-crusted tan verrucoid plaque consistent with inflamed seborrheic keratosis     Erythematous Silvery Scaling Plaque c/w Psoriasis     See annotation    No images are attached to the encounter or orders placed in the encounter.      [] Data reviewed  [] Prior external notes reviewed  [] Independent review of test  [] Management discussed with another provider  [] Independent historian    Assessment / Plan:        Psoriasis vulgaris  - chronic problem, not at treatment goal  Improving, but still in need of active treatment as problem remains.   Pt has shown great improvement with current in-office nbUVB treatment.   Will seek approval for home nbUVB unit from insurance. Prescription form, supporting documentation, and letter of medical necessity was faxed to [employer] today.    Inflamed seborrheic keratosis  Cryosurgery procedure note:  Risk, benefits, and alternatives of cryosurgery are discussed with the patient, including but not limited to the risks of hypopigmentation, hyperpigmentation, scar, infection, recurrence of lesion(s), development of new lesion(s), and need for additional treatment of the  lesion(s). Verbal consent obtained from patient. Liquid nitrogen cryosurgery applied to 1 lesion(s) to produce a freeze injury. Counseled patient that blisters may form, and instructed patient on wound care with gentle cleansing and use of Vaseline ointment to keep moist until healed. Handout was provided, and patient was instructed to return to clinic in 1-2 months if lesions do not completely resolve.    Total time spent on the date of encounter:  >30 minutes of total time spent on the date of encounter, which includes face-to-face time and non-face-to-face time preparing to see the patient (eg, review of tests); obtaining and/or reviewing separately obtained history; performing a medically appropriate examination; documenting clinical information in the electronic or other health record; counseling and educating the patient/family/caregiver; ordering medications, tests, or procedures; referring and communicating with other health care professionals (not separately reported); and/or care coordination (not separately reported).       Follow up in about 3 months (around 9/29/2023) for follow up, or sooner if symptoms worsening or not improving.      Nadia Vail MD, FAAD  [facility]

## 2023-07-06 ENCOUNTER — PATIENT MESSAGE (OUTPATIENT)
Dept: DERMATOLOGY | Facility: CLINIC | Age: 58
End: 2023-07-06
Payer: COMMERCIAL

## 2023-07-12 ENCOUNTER — CLINICAL SUPPORT (OUTPATIENT)
Dept: DERMATOLOGY | Facility: CLINIC | Age: 58
End: 2023-07-12
Payer: COMMERCIAL

## 2023-07-12 DIAGNOSIS — L40.0 PSORIASIS VULGARIS: ICD-10-CM

## 2023-07-12 PROCEDURE — 99999 PR PBB SHADOW E&M-EST. PATIENT-LVL II: CPT | Mod: PBBFAC,,,

## 2023-07-12 PROCEDURE — 99999 PR PBB SHADOW E&M-EST. PATIENT-LVL II: ICD-10-PCS | Mod: PBBFAC,,,

## 2023-07-12 PROCEDURE — 96900 PR ULTRAVIOLET LIGHT THERAPY: ICD-10-PCS | Mod: S$GLB,,, | Performed by: DERMATOLOGY

## 2023-07-12 PROCEDURE — 96900 ACTINOTHERAPY UV LIGHT: CPT | Mod: S$GLB,,, | Performed by: DERMATOLOGY

## 2023-07-18 ENCOUNTER — CLINICAL SUPPORT (OUTPATIENT)
Dept: DERMATOLOGY | Facility: CLINIC | Age: 58
End: 2023-07-18
Payer: COMMERCIAL

## 2023-07-18 DIAGNOSIS — L40.0 PSORIASIS VULGARIS: ICD-10-CM

## 2023-07-18 PROCEDURE — 96900 PR ULTRAVIOLET LIGHT THERAPY: ICD-10-PCS | Mod: S$GLB,,, | Performed by: DERMATOLOGY

## 2023-07-18 PROCEDURE — 99999 PR PBB SHADOW E&M-EST. PATIENT-LVL II: ICD-10-PCS | Mod: PBBFAC,,,

## 2023-07-18 PROCEDURE — 99999 PR PBB SHADOW E&M-EST. PATIENT-LVL II: CPT | Mod: PBBFAC,,,

## 2023-07-18 PROCEDURE — 96900 ACTINOTHERAPY UV LIGHT: CPT | Mod: S$GLB,,, | Performed by: DERMATOLOGY

## 2023-07-21 ENCOUNTER — CLINICAL SUPPORT (OUTPATIENT)
Dept: DERMATOLOGY | Facility: CLINIC | Age: 58
End: 2023-07-21
Payer: COMMERCIAL

## 2023-07-21 DIAGNOSIS — L40.0 PSORIASIS VULGARIS: ICD-10-CM

## 2023-07-21 PROCEDURE — 96900 PR ULTRAVIOLET LIGHT THERAPY: ICD-10-PCS | Mod: S$GLB,,, | Performed by: DERMATOLOGY

## 2023-07-21 PROCEDURE — 99999 PR PBB SHADOW E&M-EST. PATIENT-LVL II: CPT | Mod: PBBFAC,,,

## 2023-07-21 PROCEDURE — 96900 ACTINOTHERAPY UV LIGHT: CPT | Mod: S$GLB,,, | Performed by: DERMATOLOGY

## 2023-07-21 PROCEDURE — 99999 PR PBB SHADOW E&M-EST. PATIENT-LVL II: ICD-10-PCS | Mod: PBBFAC,,,

## 2023-07-27 ENCOUNTER — CLINICAL SUPPORT (OUTPATIENT)
Dept: DERMATOLOGY | Facility: CLINIC | Age: 58
End: 2023-07-27
Payer: COMMERCIAL

## 2023-07-27 DIAGNOSIS — L40.0 PSORIASIS VULGARIS: ICD-10-CM

## 2023-07-27 PROCEDURE — 99999 PR PBB SHADOW E&M-EST. PATIENT-LVL II: ICD-10-PCS | Mod: PBBFAC,,,

## 2023-07-27 PROCEDURE — 96900 ACTINOTHERAPY UV LIGHT: CPT | Mod: S$GLB,,, | Performed by: DERMATOLOGY

## 2023-07-27 PROCEDURE — 96900 PR ULTRAVIOLET LIGHT THERAPY: ICD-10-PCS | Mod: S$GLB,,, | Performed by: DERMATOLOGY

## 2023-07-27 PROCEDURE — 99999 PR PBB SHADOW E&M-EST. PATIENT-LVL II: CPT | Mod: PBBFAC,,,

## 2023-07-31 ENCOUNTER — OFFICE VISIT (OUTPATIENT)
Dept: OPHTHALMOLOGY | Facility: CLINIC | Age: 58
End: 2023-07-31
Payer: COMMERCIAL

## 2023-07-31 DIAGNOSIS — H57.813 BROW PTOSIS, BILATERAL: ICD-10-CM

## 2023-07-31 DIAGNOSIS — D18.01 HEMANGIOMA OF EYELID: Primary | ICD-10-CM

## 2023-07-31 PROCEDURE — 1160F RVW MEDS BY RX/DR IN RCRD: CPT | Mod: CPTII,S$GLB,, | Performed by: OPHTHALMOLOGY

## 2023-07-31 PROCEDURE — 1159F MED LIST DOCD IN RCRD: CPT | Mod: CPTII,S$GLB,, | Performed by: OPHTHALMOLOGY

## 2023-07-31 PROCEDURE — 99214 PR OFFICE/OUTPT VISIT, EST, LEVL IV, 30-39 MIN: ICD-10-PCS | Mod: S$GLB,,, | Performed by: OPHTHALMOLOGY

## 2023-07-31 PROCEDURE — 99999 PR PBB SHADOW E&M-EST. PATIENT-LVL III: ICD-10-PCS | Mod: PBBFAC,,, | Performed by: OPHTHALMOLOGY

## 2023-07-31 PROCEDURE — 1159F PR MEDICATION LIST DOCUMENTED IN MEDICAL RECORD: ICD-10-PCS | Mod: CPTII,S$GLB,, | Performed by: OPHTHALMOLOGY

## 2023-07-31 PROCEDURE — 99999 PR PBB SHADOW E&M-EST. PATIENT-LVL III: CPT | Mod: PBBFAC,,, | Performed by: OPHTHALMOLOGY

## 2023-07-31 PROCEDURE — 92285 EXTERNAL OCULAR PHOTOGRAPHY: CPT | Mod: S$GLB,,, | Performed by: OPHTHALMOLOGY

## 2023-07-31 PROCEDURE — 99214 OFFICE O/P EST MOD 30 MIN: CPT | Mod: S$GLB,,, | Performed by: OPHTHALMOLOGY

## 2023-07-31 PROCEDURE — 1160F PR REVIEW ALL MEDS BY PRESCRIBER/CLIN PHARMACIST DOCUMENTED: ICD-10-PCS | Mod: CPTII,S$GLB,, | Performed by: OPHTHALMOLOGY

## 2023-07-31 PROCEDURE — 92285 EXTERNAL PHOTOGRAPHY - OU - BOTH EYES: ICD-10-PCS | Mod: S$GLB,,, | Performed by: OPHTHALMOLOGY

## 2023-07-31 NOTE — PROGRESS NOTES
Assessment /Plan     For exam results, see Encounter Report.    Hemangioma of eyelid  -     External Photography - OU - Both Eyes    Brow ptosis, bilateral  -     External Photography - OU - Both Eyes      The patient is a pleasant 58-year-old female here for follow-up evaluation of right upper eyelid hemangioma.  There has not been in additional rupture since last visit with me.  The patient is concerned about possible rupture of the right upper eyelid hemangioma as she has her son's wedding upcoming in November of this year.      On exam, the patient continues to have a purplish 2.5 mm by 3 mm superficial hemangioma within the central aspect of the right upper eyelid.      These findings were discussed with the patient.      Discussed options of observation versus excision of the right upper eyelid hemangioma.  The patient would like to proceed with excision potentially.  We will look at upcoming surgical dates and finalize a plan.      Eyelid creams working well. No need for botulinum toxin at this time.     Pertinent risks benefits alternatives of the procedure were discussed with the patient prior to obtaining informed consent.      Return for surgery

## 2023-08-01 ENCOUNTER — TELEPHONE (OUTPATIENT)
Dept: OPHTHALMOLOGY | Facility: CLINIC | Age: 58
End: 2023-08-01
Payer: COMMERCIAL

## 2023-08-01 NOTE — TELEPHONE ENCOUNTER
Pt aware insurance in pending still opts to proceed with procedure pt was called and quoted cosmetic [facility] for procedure by [facility] if insurance does not cover.

## 2023-08-02 ENCOUNTER — PROCEDURE VISIT (OUTPATIENT)
Dept: OPHTHALMOLOGY | Facility: CLINIC | Age: 58
End: 2023-08-02
Payer: COMMERCIAL

## 2023-08-02 ENCOUNTER — HOSPITAL ENCOUNTER (OUTPATIENT)
Dept: RADIOLOGY | Facility: OTHER | Age: 58
Discharge: HOME | End: 2023-08-02
Attending: SURGERY
Payer: COMMERCIAL

## 2023-08-02 VITALS — DIASTOLIC BLOOD PRESSURE: 80 MMHG | SYSTOLIC BLOOD PRESSURE: 126 MMHG | HEART RATE: 70 BPM

## 2023-08-02 DIAGNOSIS — I83.893 VARICOSE VEINS OF LEG WITH EDEMA, BILATERAL: ICD-10-CM

## 2023-08-02 DIAGNOSIS — H02.9 LESION OF RIGHT UPPER EYELID: ICD-10-CM

## 2023-08-02 DIAGNOSIS — D18.01 HEMANGIOMA OF EYELID: Primary | ICD-10-CM

## 2023-08-02 PROCEDURE — 88305 TISSUE EXAM BY PATHOLOGIST: CPT | Performed by: STUDENT IN AN ORGANIZED HEALTH CARE EDUCATION/TRAINING PROGRAM

## 2023-08-02 PROCEDURE — 93970 EXTREMITY STUDY: CPT | Mod: TC

## 2023-08-02 PROCEDURE — 88305 TISSUE EXAM BY PATHOLOGIST: ICD-10-PCS | Mod: 26,,, | Performed by: STUDENT IN AN ORGANIZED HEALTH CARE EDUCATION/TRAINING PROGRAM

## 2023-08-02 PROCEDURE — 93970 EXTREMITY STUDY: CPT | Mod: 26,,, | Performed by: RADIOLOGY

## 2023-08-02 PROCEDURE — 93970 US LOWER EXTREMITY VEINS BILATERAL INSUFFICIENCY: ICD-10-PCS | Mod: 26,,, | Performed by: RADIOLOGY

## 2023-08-02 PROCEDURE — 11440 PR EXC SKIN BENIG <0.5 CM FACE,FACIAL: ICD-10-PCS | Mod: S$GLB,,, | Performed by: OPHTHALMOLOGY

## 2023-08-02 PROCEDURE — 88305 TISSUE EXAM BY PATHOLOGIST: CPT | Mod: 26,,, | Performed by: STUDENT IN AN ORGANIZED HEALTH CARE EDUCATION/TRAINING PROGRAM

## 2023-08-02 PROCEDURE — 99499 NO LOS: ICD-10-PCS | Mod: S$GLB,,, | Performed by: OPHTHALMOLOGY

## 2023-08-02 PROCEDURE — 11440 EXC FACE-MM B9+MARG 0.5 CM/<: CPT | Mod: S$GLB,,, | Performed by: OPHTHALMOLOGY

## 2023-08-02 PROCEDURE — 99499 UNLISTED E&M SERVICE: CPT | Mod: S$GLB,,, | Performed by: OPHTHALMOLOGY

## 2023-08-02 RX ORDER — NEOMYCIN SULFATE, POLYMYXIN B SULFATE, AND DEXAMETHASONE 3.5; 10000; 1 MG/G; [USP'U]/G; MG/G
OINTMENT OPHTHALMIC NIGHTLY
Qty: 3.5 G | Refills: 1 | Status: SHIPPED | OUTPATIENT
Start: 2023-08-02

## 2023-08-02 NOTE — PROGRESS NOTES
HPI    DLS: 7/31/23      Pt presents today for RUL excisional biopsy. Pt has no new changes and is   in no pain.    EYE MEDS: no eye meds    ORAL MEDS: no oral meds.    Last edited by Honey Barnes MA on 8/2/2023 10:36 AM.            Assessment /Plan     For exam results, see Encounter Report.    Hemangioma of eyelid    Lesion of right upper eyelid  -     Specimen to Pathology Ophthalmology    Other orders  -     neomycin-polymyxin-dexamethasone (MAXITROL) 3.5 mg/g-10,000 unit/g-0.1 % Oint; Place into the right eye every evening.  Dispense: 3.5 g; Refill: 1        Pertinent r/b/a d/w patient prior to obtaining informed consent.      PROCEDURE NOTE:  Excision of Eyelid Lesion; Right Upper Eyelid  Attending: Selena Smith MD  Resident: Fabiola Braun MD, PGY-3  Pre-op Dx: Eyelid lesion of undetermined significance, right upper eyelid  Post-op Dx: same  Local: 2% lidocaine with 1:100,000 epinephrine, 0.5 % marcaine with vitrase  Specimens: eyelid lesion, right upper  Complications: None  Blood Loss: minimal      Local injection of 2% lidocaine with epinephrine and 0.5% marcaine and Vitrase was placed into the right upper eyelid under the lesion. The patient was prepped and draped in the usual sterile manner for ophthalmic plastic surgery. A corneal shield was placed in the right eye. The skin on the right upper eyelid was incised with 15 blade. The lesion was then tented with forceps and dissected with sharp Arthur scissors. The lesion was then excised with sharp Arthur scissors. Hemostasis was achieved with cautery. One simple interrupted 6-0 plain gut suture on G1 was used to closed the skin only. Maxitrol mckayla was applied to the wound. The patient was instructed to use Maxitrol mckayla QHS for 7-10 days.      Post-operative instructions were given to the patient.

## 2023-08-10 LAB
FINAL PATHOLOGIC DIAGNOSIS: NORMAL
Lab: NORMAL

## 2023-08-11 ENCOUNTER — TELEPHONE (OUTPATIENT)
Dept: OPHTHALMOLOGY | Facility: CLINIC | Age: 58
End: 2023-08-11
Payer: COMMERCIAL

## 2023-08-25 ENCOUNTER — OFFICE VISIT (OUTPATIENT)
Dept: VASCULAR SURGERY | Facility: CLINIC | Age: 58
End: 2023-08-25
Payer: COMMERCIAL

## 2023-08-25 VITALS
BODY MASS INDEX: 22.76 KG/M2 | HEART RATE: 83 BPM | SYSTOLIC BLOOD PRESSURE: 118 MMHG | HEIGHT: 67 IN | DIASTOLIC BLOOD PRESSURE: 66 MMHG | WEIGHT: 145 LBS

## 2023-08-25 DIAGNOSIS — I78.1 SPIDER VEIN, SYMPTOMATIC: Primary | ICD-10-CM

## 2023-08-25 DIAGNOSIS — I83.93 ASYMPTOMATIC VARICOSE VEINS OF BOTH LOWER EXTREMITIES: ICD-10-CM

## 2023-08-25 PROCEDURE — 1159F PR MEDICATION LIST DOCUMENTED IN MEDICAL RECORD: ICD-10-PCS | Mod: CPTII,S$GLB,, | Performed by: SURGERY

## 2023-08-25 PROCEDURE — 3078F PR MOST RECENT DIASTOLIC BLOOD PRESSURE < 80 MM HG: ICD-10-PCS | Mod: CPTII,S$GLB,, | Performed by: SURGERY

## 2023-08-25 PROCEDURE — 99203 PR OFFICE/OUTPT VISIT, NEW, LEVL III, 30-44 MIN: ICD-10-PCS | Mod: S$GLB,,, | Performed by: SURGERY

## 2023-08-25 PROCEDURE — 99203 OFFICE O/P NEW LOW 30 MIN: CPT | Mod: S$GLB,,, | Performed by: SURGERY

## 2023-08-25 PROCEDURE — 3074F SYST BP LT 130 MM HG: CPT | Mod: CPTII,S$GLB,, | Performed by: SURGERY

## 2023-08-25 PROCEDURE — 3008F BODY MASS INDEX DOCD: CPT | Mod: CPTII,S$GLB,, | Performed by: SURGERY

## 2023-08-25 PROCEDURE — 3078F DIAST BP <80 MM HG: CPT | Mod: CPTII,S$GLB,, | Performed by: SURGERY

## 2023-08-25 PROCEDURE — 3008F PR BODY MASS INDEX (BMI) DOCUMENTED: ICD-10-PCS | Mod: CPTII,S$GLB,, | Performed by: SURGERY

## 2023-08-25 PROCEDURE — 3074F PR MOST RECENT SYSTOLIC BLOOD PRESSURE < 130 MM HG: ICD-10-PCS | Mod: CPTII,S$GLB,, | Performed by: SURGERY

## 2023-08-25 PROCEDURE — 1159F MED LIST DOCD IN RCRD: CPT | Mod: CPTII,S$GLB,, | Performed by: SURGERY

## 2023-08-25 NOTE — PROGRESS NOTES
Monroe Solitario MD, RPVI                                 [facility]                           [facility]                             08/25/2023    HPI:  Jeana Izquierdo is a 58 y.o. female with   Patient Active Problem List   Diagnosis    History of nonmelanoma skin cancer    being managed by PCP and specialists who is here today for evaluation of BLE spider veins.  Patient states location is BLE occurring for yrs.  Associated signs and symptoms include pain.  Quality is tender and severity is 4/10.  Symptoms began yrs ago.  Alleviating factors include elevation.  Worsening factors include dependency.  Patient has been wearing compression stockings for greater than 3 months.  Symptoms do limit patient's functional status and daily activities.  no DVT history.  no venous interventions.  + low sodium diet.  no excessive water intake.    C/o R 2nd digit and bilateral 1-2 toe decreased sensation with digit discoloration during cold temperatures.  Intermittent with resolution after massaging.    Migraine with aura: no  PFO/ASD/right to left shunt: no  Pregnant: no  Breastfeeding: no    no MI  no Stroke  Tobacco use: denies    Past Medical History:   Diagnosis Date    Basal cell carcinoma 2016    nose     Past Surgical History:   Procedure Laterality Date    DILATION AND CURETTAGE OF UTERUS  2000     Family History   Problem Relation Age of Onset    Diabetes Father     Hyperlipidemia Father     Heart disease Mother     Cancer Maternal Aunt     Stroke Maternal Grandfather     Cancer Paternal Grandmother     Melanoma Neg Hx      Social History     Socioeconomic History    Marital status:    Tobacco Use    Smoking status: Never    Smokeless tobacco: Never   Substance and Sexual Activity    Alcohol use: Yes     Comment: social    Drug use: No   Other Topics Concern    Are you pregnant or think you may be? No    Breast-feeding No       Current Outpatient Medications:      betamethasone dipropionate (DIPROLENE) 0.05 % lotion, Apply to scalp daily prn psoriasis, Disp: 60 mL, Rfl: 3    calcium carbonate-vitamin D3 (CALCIUM 500 + D) 500 mg-10 mcg (400 unit) Chew, Take by mouth once., Disp: , Rfl:     DERMA-SMOOTHE/FS SCALP OIL 0.01 % Oil, Apply oil to damp scalp at bedtime as needed for scaling. Can cover with shower cap after application., Disp: 118.28 mL, Rfl: 5    folic acid/multivit-min/lutein (CENTRUM SILVER ORAL), Take by mouth., Disp: , Rfl:     mometasone (ELOCON) 0.1 % solution, Apply solution to damp scalp at bedtime as needed scaling. Can cover with shower cap after application., Disp: 60 mL, Rfl: 3    mv-mn/iron/folic acid/herb 190 (VITAMIN D3 COMPLETE ORAL), Take by mouth., Disp: , Rfl:     phytosterol/pantethine (CHOLESTOFF COMPLETE ORAL), Take by mouth., Disp: , Rfl:     augmented betamethasone dipropionate (DIPROLENE-AF) 0.05 % cream, Apply to affected areas of body daily prn psoriasis. (Patient not taking: Reported on 8/25/2023), Disp: 50 g, Rfl: 5    diazePAM (VALIUM) 5 MG tablet, Take 1 tablet (5 mg total) by mouth when arrive at the office, Disp: 2 tablet, Rfl: 0    neomycin-polymyxin-dexamethasone (MAXITROL) 3.5 mg/g-10,000 unit/g-0.1 % Oint, Place into the right eye every evening., Disp: 3.5 g, Rfl: 1    REVIEW OF SYSTEMS:  General: No fevers or chills; ENT: No sore throat; Allergy and Immunology: no persistent infections; Hematological and Lymphatic: No history of bleeding or easy bruising; Endocrine: negative; Respiratory: no cough, shortness of breath, or wheezing; Cardiovascular: no chest pain or dyspnea on exertion; Gastrointestinal: no abdominal pain/back, change in bowel habits, or bloody stools; Genito-Urinary: no dysuria, trouble voiding, or hematuria; Musculoskeletal: no edema; Neurological: no TIA or stroke symptoms; Psychiatric: no nervousness, anxiety or depression.    PHYSICAL EXAM:      Pulse: 83         General appearance:  Alert, well-appearing, and  "in no distress.  Oriented to person, place, and time                    Neurological: Normal speech, no focal findings noted; CN II - XII grossly intact. RLE with sensation to light touch, LLE with sensation to light touch.            Musculoskeletal: Digits/nail without cyanosis/clubbing.  Strength 5/5 BLE.                    Neck: Supple, no significant adenopathy                  Chest:  No wheezes, symmetric air entry. No use of accessory muscles               Cardiac: Normal rate and regular rhythm            Abdomen: Soft, nontender, nondistended      Extremities:   2+ R DP pulse, 2+ L DP pulse      no RLE edema, no LLE edema    Skin:  RLE no ulcer; LLE no ulcer      RLE + spider veins, LLE + spider veins      RLE no varicose veins, LLE no varicose veins    CEAP 1/1    VCSS 2    LAB RESULTS:  No results found for: "CBC"  No results found for: "LABPROT", "INR"  Lab Results   Component Value Date     04/02/2013    K 4.2 04/02/2013     04/02/2013    CO2 27 04/02/2013    GLU 87 04/02/2013    BUN 16 04/02/2013    CREATININE 0.8 04/02/2013    CALCIUM 9.6 04/02/2013    ANIONGAP 8.0 04/02/2013    EGFRNONAA >60 04/02/2013     Lab Results   Component Value Date    WBC 4.59 (L) 04/02/2013    RBC 4.63 04/02/2013    HGB 14.1 04/02/2013    HCT 41.7 04/02/2013    MCV 90.1 04/02/2013    MCH 30.5 04/02/2013    MCHC 33.8 04/02/2013    RDW 13.1 04/02/2013     04/02/2013    MPV 12.4 04/02/2013    GRAN 1.9 04/02/2013    GRAN 42.1 04/02/2013    LYMPH 46.6 (H) 04/02/2013    LYMPH 2.1 04/02/2013    MONO 9.2 (H) 04/02/2013    MONO 0.4 04/02/2013    EOS 0.1 04/02/2013    BASO 0.0 04/02/2013    EOSINOPHIL 1.5 04/02/2013    BASOPHIL 0.4 04/02/2013     .  Lab Results   Component Value Date    HGBA1C 5.1 03/22/2012       IMAGING:  All pertinent imaging has been reviewed and interpreted independently.    Venous US 8/2023 Impression:  Impression:     No evidence of DVT in either lower extremity deep venous system.     On " the right, there is hemodynamically significant reflux identified within the greater saphenous vein at the level of the mid calf and the lesser saphenous vein at the level of the mid calf.     On the left, there is hemodynamically significant reflux identified within the greater saphenous vein at the level of the mid calf and the lesser saphenous vein at the level of the mid calf.        Electronically signed by: Malik Guerrero MD  Date:                                            08/02/2023    IMP/PLAN:  58 y.o. female with   Patient Active Problem List   Diagnosis    History of nonmelanoma skin cancer    being managed by PCP and specialists who is here today for evaluation of BLE spider veins.    -rec BLE sclerotherapy  -recommend compression with stockings, elevation  -cont Exercise    I spent 11 minutes evaluating this patient and greater than 50% of the time was spent counseling, coordinator care and discussing the plan of care.  All questions were answered and patient stated understanding with agreement with the above treatment plan.    Monroe Solitario MD [facility]  Vascular and Endovascular Surgery

## 2023-08-29 DIAGNOSIS — I73.00 RAYNAUD'S DISEASE WITHOUT GANGRENE: Primary | ICD-10-CM

## 2023-08-29 DIAGNOSIS — I78.1 SPIDER VEIN, SYMPTOMATIC: ICD-10-CM

## 2023-09-13 ENCOUNTER — TELEPHONE (OUTPATIENT)
Dept: VASCULAR SURGERY | Facility: CLINIC | Age: 58
End: 2023-09-13
Payer: COMMERCIAL

## 2023-10-15 ENCOUNTER — PATIENT MESSAGE (OUTPATIENT)
Dept: VASCULAR SURGERY | Facility: CLINIC | Age: 58
End: 2023-10-15
Payer: COMMERCIAL

## 2023-10-19 ENCOUNTER — TELEPHONE (OUTPATIENT)
Dept: VASCULAR SURGERY | Facility: CLINIC | Age: 58
End: 2023-10-19
Payer: COMMERCIAL

## 2024-04-18 ENCOUNTER — PATIENT MESSAGE (OUTPATIENT)
Dept: DERMATOLOGY | Facility: CLINIC | Age: 59
End: 2024-04-18
Payer: COMMERCIAL

## 2024-07-23 ENCOUNTER — OFFICE VISIT (OUTPATIENT)
Dept: DERMATOLOGY | Facility: CLINIC | Age: 59
End: 2024-07-23
Payer: COMMERCIAL

## 2024-07-23 DIAGNOSIS — L81.4 LENTIGINES: ICD-10-CM

## 2024-07-23 DIAGNOSIS — D22.9 MULTIPLE BENIGN NEVI: ICD-10-CM

## 2024-07-23 DIAGNOSIS — Z12.83 SCREENING EXAM FOR SKIN CANCER: ICD-10-CM

## 2024-07-23 DIAGNOSIS — L71.9 ROSACEA: ICD-10-CM

## 2024-07-23 DIAGNOSIS — L82.1 SEBORRHEIC KERATOSIS: ICD-10-CM

## 2024-07-23 DIAGNOSIS — L40.0 PSORIASIS VULGARIS: Primary | ICD-10-CM

## 2024-07-23 DIAGNOSIS — Z85.828 HISTORY OF NONMELANOMA SKIN CANCER: ICD-10-CM

## 2024-07-23 PROCEDURE — G2211 COMPLEX E/M VISIT ADD ON: HCPCS | Mod: S$GLB,,, | Performed by: DERMATOLOGY

## 2024-07-23 PROCEDURE — 99214 OFFICE O/P EST MOD 30 MIN: CPT | Mod: S$GLB,,, | Performed by: DERMATOLOGY

## 2024-07-23 PROCEDURE — 1159F MED LIST DOCD IN RCRD: CPT | Mod: CPTII,S$GLB,, | Performed by: DERMATOLOGY

## 2024-07-23 PROCEDURE — 1160F RVW MEDS BY RX/DR IN RCRD: CPT | Mod: CPTII,S$GLB,, | Performed by: DERMATOLOGY

## 2024-07-23 RX ORDER — FLUOCINOLONE ACETONIDE 0.11 MG/ML
OIL TOPICAL
Qty: 118.28 ML | Refills: 5 | Status: SHIPPED | OUTPATIENT
Start: 2024-07-23

## 2024-07-23 NOTE — PROGRESS NOTES
"  Patient Information  Name: Jeana Izquierdo  : 1965  MRN: [id number]     Referring Physician:  No ref. provider found   Primary Care Physician:  Chasity Kimball MD   Date of Visit: 2024      Subjective:     History of Present lllness:    Jeana Izquierdo is a 59 y.o. female who presents with a chief complaint of moles, spot, lesions, and psoriasis.  This is a high risk patient with a personal history of nonmelanoma skin cancer who is here today to check for the development of new lesions.  Patient is here today for a "mole" check.     Today, patient complains of:    Spot  Location: left upper arm  Duration: months  Signs/Symptoms: new spots  Exacerbating factors: none  Relieving factors/Prior treatments: none    Lesion  Location: left and right temple  Duration: months  Signs/Symptoms: new spot  Exacerbating factors: none  Relieving factors/Prior treatments: none    Psoriasis  Location: diffuse, currently clear  Signs/Symptoms: scaly spots  Symptom course: improving  Current treatment: UVB light tx    Patient was last seen: 2023.  Prior notes by myself reviewed.   Clinical documentation obtained by nursing staff reviewed.    Review of Systems   Skin:  Positive for activity-related sunscreen use. Negative for itching, rash and wears hat.       Objective:   Physical Exam   Constitutional: She appears well-developed and well-nourished. No distress.   HENT:   Mouth/Throat: Lips normal.    Eyes: Lids are normal.  No conjunctival no injection.   Neurological: She is alert and oriented to person, place, and time. She is not disoriented.   Psychiatric: She has a normal mood and affect.   Skin:   Areas Examined (abnormalities noted in diagram):   Scalp / Hair Palpated and Inspected  Head / Face Inspection Performed  Neck Inspection Performed  Chest / Axilla Inspection Performed  Abdomen Inspection Performed  Genitals / Buttocks / Groin Inspection Performed  Back Inspection Performed  RUE " Inspected  LUE Inspection Performed  RLE Inspected  LLE Inspection Performed  Nails and Digits Inspection Performed                 Diagram Legend     Erythematous scaling macule/papule c/w actinic keratosis       Vascular papule c/w angioma      Pigmented verrucoid papule/plaque c/w seborrheic keratosis      Yellow umbilicated papule c/w sebaceous hyperplasia      Irregularly shaped tan macule c/w lentigo     1-2 mm smooth white papules consistent with Milia      Movable subcutaneous cyst with punctum c/w epidermal inclusion cyst      Subcutaneous movable cyst c/w pilar cyst      Firm pink to brown papule c/w dermatofibroma      Pedunculated fleshy papule(s) c/w skin tag(s)      Evenly pigmented macule c/w junctional nevus     Mildly variegated pigmented, slightly irregular-bordered macule c/w mildly atypical nevus      Flesh colored to evenly pigmented papule c/w intradermal nevus       Pink pearly papule/plaque c/w basal cell carcinoma      Erythematous hyperkeratotic cursted plaque c/w SCC      Surgical scar with no sign of skin cancer recurrence      Open and closed comedones      Inflammatory papules and pustules      Verrucoid papule consistent consistent with wart     Erythematous eczematous patches and plaques     Dystrophic onycholytic nail with subungual debris c/w onychomycosis     Umbilicated papule    Erythematous-base heme-crusted tan verrucoid plaque consistent with inflamed seborrheic keratosis     Erythematous Silvery Scaling Plaque c/w Psoriasis     See annotation    No images are attached to the encounter or orders placed in the encounter.      [] Data reviewed  [] Prior external notes reviewed  [] Independent review of test  [] Management discussed with another provider  [] Independent historian    Assessment / Plan:        Psoriasis vulgaris   - stable and chronic  -     fluocinolone and shower cap 0.01 % Oil; Apply oil to damp scalp at bedtime as needed for scaling. Can cover with shower cap  after application.  Dispense: 118.28 mL; Refill: 5  Will refill nbUVB when running low on txs.    Rosacea   - stable and chronic  Pt controls with diet.    Multiple benign nevi  Multiple benign-appearing nevi present on exam today. Reassurance provided. Counseled patient to periodically examine moles and return to clinic if any changes in size, shape, or color are noted or if it becomes symptomatic (bleeding, itching, pain, etc).  Recommend using a broad-spectrum, water-resistant sunscreen with SPF of 30 or higher--reapply every 2 hours. Seek shade, wear sun-protective clothing, and perform regular skin self-exams.    Lentigines  These are benign sun spots which should be monitored for changes. Daily sun protection will reduce the number of new lesions.   Recommend using a broad-spectrum, water-resistant sunscreen with SPF of 30 or higher--reapply every 2 hours. Seek shade, wear sun-protective clothing, and perform regular skin self-exams.    Seborrheic keratosis  These are benign, inherited growths without a malignant potential. Reassurance given to patient. No treatment is necessary.    History of nonmelanoma skin cancer   - stable and chronic  Area(s) of previous nonmelanoma skin cancer evaluated with no evidence of recurrence. Reassurance provided.  Recommend using a broad-spectrum, water-resistant sunscreen with SPF of 30 or higher--reapply every 2 hours. Seek shade, wear sun-protective clothing, and perform regular skin self-exams.    Screening exam for skin cancer  Total body skin examination performed today as noted in physical exam. No lesions suspicious for malignancy were seen.  Recommend using a broad-spectrum, water-resistant sunscreen with SPF of 30 or higher--reapply every 2 hours. Seek shade, wear sun-protective clothing, and perform regular skin self-exams.       Follow up in about 1 year (around 7/23/2025) for TBSE, or sooner if any new problems or changing lesions.      Nadia Vail MD,  [facility]AA[facility]ON[facility]

## 2024-08-19 DIAGNOSIS — I78.1 SPIDER VEIN, SYMPTOMATIC: Primary | ICD-10-CM

## 2024-12-06 DIAGNOSIS — F41.9 ANXIETY DUE TO INVASIVE PROCEDURE: Primary | ICD-10-CM

## 2024-12-06 RX ORDER — ALPRAZOLAM 0.5 MG/1
TABLET ORAL
Qty: 2 TABLET | Refills: 0 | Status: SHIPPED | OUTPATIENT
Start: 2024-12-06

## 2024-12-11 ENCOUNTER — PROCEDURE VISIT (OUTPATIENT)
Dept: VASCULAR SURGERY | Facility: CLINIC | Age: 59
End: 2024-12-11
Payer: COMMERCIAL

## 2024-12-11 DIAGNOSIS — I78.1 SPIDER VEIN, SYMPTOMATIC: Primary | ICD-10-CM

## 2024-12-12 NOTE — PROCEDURES
12/12/2024    Pre-Procedure Diagnosis:   1. Bilateral lower extremity spider veins  2. Bilateral lower extremity reticular veins    Post-Procedure Diagnsosis: Same    Procedure: Bilateral lower extremity sclerotherapy with Asclera 2%    Surgeon: MD THANIA Byers    Anesthesia: Asclera     Estimated blood loss: <5cc    Complications: None       Time out performed and patient was prepped and draped in sterile fashion.  Vein light used to identify reticular veins feeding bilateral lower extremity spider veins of the thigh, knee, lower leg and ankles.  Skin overlying affected areas cleaned with alcohol.  Reticular veins were then injected with 0.5% Asclera foam sclerosant created with Tessari method with resolution of reticular veins.  Next the spider veins were identified and the overlying skin was cleaned with alcohol.  The veins were accessed with a 30 gauge needle and injected with 0.25% Asclera liquid sclerosant with near resolution of all treated areas.  Once all areas of concern to the patient were treated, sterile cotton balls and Tegaderms were applied for light compression as well as ice for 5 minutes.  We then placed a Coban wrap over the treated areas followed by compression stockings.  The patient ambulated after the procedure without issues.      MD THANIA Byers  [facility]

## 2025-03-26 ENCOUNTER — PATIENT MESSAGE (OUTPATIENT)
Dept: DERMATOLOGY | Facility: CLINIC | Age: 60
End: 2025-03-26
Payer: COMMERCIAL

## 2025-06-02 ENCOUNTER — PATIENT MESSAGE (OUTPATIENT)
Dept: DERMATOLOGY | Facility: CLINIC | Age: 60
End: 2025-06-02
Payer: COMMERCIAL